# Patient Record
Sex: FEMALE | Race: BLACK OR AFRICAN AMERICAN | NOT HISPANIC OR LATINO | RURAL
[De-identification: names, ages, dates, MRNs, and addresses within clinical notes are randomized per-mention and may not be internally consistent; named-entity substitution may affect disease eponyms.]

---

## 2017-08-15 PROBLEM — H01.022: Noted: 2017-08-15

## 2017-08-15 PROBLEM — H54.0: Noted: 2017-08-15

## 2017-08-15 PROBLEM — Z90.01: Noted: 2017-08-15

## 2017-08-15 PROBLEM — H40.89: Noted: 2017-08-15

## 2017-08-15 PROBLEM — H01.025: Noted: 2017-08-15

## 2020-03-10 ENCOUNTER — PREPPED CHART (OUTPATIENT)
Dept: RURAL CLINIC 1 | Facility: CLINIC | Age: 85
End: 2020-03-10

## 2020-03-10 ENCOUNTER — IMPORTED ENCOUNTER (OUTPATIENT)
Dept: URBAN - NONMETROPOLITAN AREA CLINIC 1 | Facility: CLINIC | Age: 85
End: 2020-03-10

## 2020-03-10 PROCEDURE — 92012 INTRM OPH EXAM EST PATIENT: CPT

## 2020-03-10 NOTE — PATIENT DISCUSSION
*Blepharitis:. -  Discussed findings of exam in detail with the patient. -  Discussed the chronic nature and treatment options with the patient. may restart maxitrol glenda  if neededS/P BLIND EYE OUPhthisical OD. OD Neovascular  GlaucomaOS ENDOPHTHALMITIS WITH ENUCLEATION; 's Notes: OK TO REFILL MAXITROL GLENDA

## 2021-06-01 ENCOUNTER — IMPORTED ENCOUNTER (OUTPATIENT)
Dept: URBAN - NONMETROPOLITAN AREA CLINIC 1 | Facility: CLINIC | Age: 86
End: 2021-06-01

## 2021-06-01 PROCEDURE — 99212 OFFICE O/P EST SF 10 MIN: CPT

## 2021-06-01 NOTE — PATIENT DISCUSSION
S/P BLIND EYE OUPhthisical OD. OD Neovascular  GlaucomaOS ENDOPHTHALMITIS WITH ENUCLEATIONeducate pt's fmaily on how to insert and remove clear eye shield osstart tears prn osrtc prn; Dr's Notes: OK TO REFILL MAXITROL MARTÍN

## 2022-02-08 ENCOUNTER — ESTABLISHED PATIENT (OUTPATIENT)
Dept: RURAL CLINIC 1 | Facility: CLINIC | Age: 87
End: 2022-02-08

## 2022-02-08 DIAGNOSIS — H54.8: ICD-10-CM

## 2022-02-08 PROCEDURE — 99213 OFFICE O/P EST LOW 20 MIN: CPT
